# Patient Record
Sex: MALE | Race: BLACK OR AFRICAN AMERICAN | NOT HISPANIC OR LATINO | URBAN - METROPOLITAN AREA
[De-identification: names, ages, dates, MRNs, and addresses within clinical notes are randomized per-mention and may not be internally consistent; named-entity substitution may affect disease eponyms.]

---
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
## 2024-05-21 ENCOUNTER — APPOINTMENT (OUTPATIENT)
Dept: ORTHOPEDIC SURGERY | Facility: CLINIC | Age: 19
End: 2024-05-21
Payer: MEDICAID

## 2024-05-21 ENCOUNTER — NON-APPOINTMENT (OUTPATIENT)
Age: 19
End: 2024-05-21

## 2024-05-21 VITALS — HEIGHT: 70 IN | BODY MASS INDEX: 18.61 KG/M2 | WEIGHT: 130 LBS

## 2024-05-21 DIAGNOSIS — S61.419A LACERATION W/OUT FOREIGN BODY OF UNSPECIFIED HAND, INITIAL ENCOUNTER: ICD-10-CM

## 2024-05-21 PROBLEM — J45.909 UNSPECIFIED ASTHMA, UNCOMPLICATED: Chronic | Status: ACTIVE | Noted: 2019-12-28

## 2024-05-21 PROBLEM — Z00.00 ENCOUNTER FOR PREVENTIVE HEALTH EXAMINATION: Status: ACTIVE | Noted: 2024-05-21

## 2024-05-21 PROCEDURE — 99203 OFFICE O/P NEW LOW 30 MIN: CPT

## 2024-05-22 NOTE — HISTORY OF PRESENT ILLNESS
[de-identified] : Pt is a 17 y/o male with right thumb laceration.  He was cutting open a hot dog package and he slipped.  The knife when through his thumb.  He went to the ED where the lacerations were sutured.  He was told to follow up with a specialist.  He was told that he may have nerve damage.  Xrays were negative for fracture or foreign body.  He has tingling in the finger.

## 2024-05-22 NOTE — END OF VISIT
[FreeTextEntry3] :  All medical record entries made by the Scribe were at my,  Dr. Oscar James MD., direction and personally dictated by me on 05/21/2024. I have personally reviewed the chart and agree that the record accurately reflects my personal performance of the history, physical exam, assessment and plan.

## 2024-05-22 NOTE — PHYSICAL EXAM
[de-identified] : Patient is WDWN, alert, and in no acute distress. Breathing is unlabored. He is grossly oriented to person, place, and time.   He was accompanied by His mother today.  Left Hand:  wound is present  tenderness is present edema is present limited ROM  sensation is intact

## 2024-05-22 NOTE — DISCUSSION/SUMMARY
[de-identified] :  The underlying pathophysiology was reviewed with the patient. XR films were reviewed with the patient. Discussed at length the nature of the patient's condition.   Patient was advised to take OTC medications and topical analgesic for pain management.  The wound was cleaned with hydrogen peroxide and regarding the pain he was advised to move the hand as much as he can to bring back the ROM and avoid stiffness.   The patient wishes to proceed with hand therapy Right Hand ROM and strengthening. A script was given.  All questions answered, understanding verbalized. Patient in agreement with plan of care.   Patient was advised to follow up in one month.

## 2024-05-22 NOTE — ADDENDUM
[FreeTextEntry1] :  I, Desiree Enrique wrote this note acting as a scribe for Dr. Oscar James on May 21, 2024.

## 2024-07-09 ENCOUNTER — APPOINTMENT (OUTPATIENT)
Dept: ORTHOPEDIC SURGERY | Facility: CLINIC | Age: 19
End: 2024-07-09
Payer: MEDICAID

## 2024-07-09 DIAGNOSIS — S61.419A LACERATION W/OUT FOREIGN BODY OF UNSPECIFIED HAND, INITIAL ENCOUNTER: ICD-10-CM

## 2024-07-09 PROCEDURE — 99213 OFFICE O/P EST LOW 20 MIN: CPT
